# Patient Record
Sex: MALE | Race: WHITE | NOT HISPANIC OR LATINO | URBAN - NONMETROPOLITAN AREA
[De-identification: names, ages, dates, MRNs, and addresses within clinical notes are randomized per-mention and may not be internally consistent; named-entity substitution may affect disease eponyms.]

---

## 2016-04-22 PROBLEM — H25.13: Noted: 2017-11-15

## 2016-04-22 PROBLEM — H43.813: Noted: 2019-10-11

## 2016-04-22 PROBLEM — H11.053: Noted: 2017-11-15

## 2016-04-22 PROBLEM — H25.13: Noted: 2019-10-11

## 2016-04-22 PROBLEM — H11.053: Noted: 2019-10-11

## 2019-04-08 ENCOUNTER — IMPORTED ENCOUNTER (OUTPATIENT)
Dept: URBAN - NONMETROPOLITAN AREA CLINIC 1 | Facility: CLINIC | Age: 69
End: 2019-04-08

## 2019-04-08 PROCEDURE — 92015 DETERMINE REFRACTIVE STATE: CPT

## 2019-04-08 PROCEDURE — 92014 COMPRE OPH EXAM EST PT 1/>: CPT

## 2019-04-08 NOTE — PATIENT DISCUSSION
Peripheral Pterygium - Discussed diagnosis in detail with patient - Appears stable at this time- Continue FML QD OD IOP stable on today's exam 18 OU- Continue to monitor NS OU - Discussed diagnosis in detail with patient- Discussed signs and symptoms associated - Recommend UV protection - No treatment needed at this time- Continue to monitor Compound Hyperopic Astigmatism OU w/Presbyopia-  discussed findings w/patient-  new spectacle Rx issued-  continue to monitor yearly or prn; 's Notes: MR 4/8/2019DFE 4/8/2019

## 2019-10-11 ENCOUNTER — IMPORTED ENCOUNTER (OUTPATIENT)
Dept: URBAN - NONMETROPOLITAN AREA CLINIC 1 | Facility: CLINIC | Age: 69
End: 2019-10-11

## 2019-10-11 PROCEDURE — 92014 COMPRE OPH EXAM EST PT 1/>: CPT

## 2019-10-11 NOTE — PATIENT DISCUSSION
Peripheral Pterygium - Discussed diagnosis in detail with patient - Appears stable at this time- Continue FML QD OD IOP stable on today's exam 17 OU- Continue to monitor NS OU - Discussed diagnosis in detail with patient- Discussed signs and symptoms associated - Recommend UV protection - No treatment needed at this time- Continue to monitor Compound Hyperopic Astigmatism OU w/Presbyopia-  discussed findings w/patient-  new spectacle Rx issued last visit-  continue to monitor yearly or prnPVD OU - Discussed findings of exam in detail with the patient. - The risk of retinal detachment in patients with PVDs was discussed with the patient and the warning signs of retinal detachment were carefully reviewed with the patient. - The patient was warned to return to the office or contact the ophthalmologist on call immediately if they experience signs of retinal detachment or changes noted in vision from today.  - Continue to monitor; 's Notes:  4/8/2019DFE 4/8/2019

## 2020-06-09 ENCOUNTER — IMPORTED ENCOUNTER (OUTPATIENT)
Dept: URBAN - NONMETROPOLITAN AREA CLINIC 1 | Facility: CLINIC | Age: 70
End: 2020-06-09

## 2020-06-09 PROCEDURE — 99214 OFFICE O/P EST MOD 30 MIN: CPT

## 2020-06-09 NOTE — PATIENT DISCUSSION
NS OU - Discussed diagnosis in detail with patient- Discussed signs and symptoms associated - Recommend UV protection - No treatment needed at this time- Continue to monitorPeripheral Pterygium - Discussed diagnosis in detail with patient - Appears stable at this time- Continue FML QD OD- Continue to monitor as scheduled or prnPtosis/Dermatochalasis OU-  discussed findings w/patient-  he is bothered by the drooping and the loss of superior VF-  refer to [facility] Olmstead for further evaluationConjunctival Cyst OS-  discussed findings w/patient-  patient is bothered by it and has tried to pop it-  asked patient not to touch it and will have Medina Olmstead take a look when he comes for Ptosis eval-  continue to monitor as scheduled or prnPVD OU - Discussed findings of exam in detail with the patient. - The risk of retinal detachment in patients with PVDs was discussed with the patient and the warning signs of retinal detachment were carefully reviewed with the patient. - The patient was warned to return to the office or contact the ophthalmologist on call immediately if they experience signs of retinal detachment or changes noted in vision from today.  - Continue to monitor; 's Notes: MR 4/8/2019 defer 6/9/2020DFE 6/9/2020

## 2020-07-08 PROBLEM — H11.442: Noted: 2020-07-08

## 2020-07-08 PROBLEM — H02.831: Noted: 2020-07-08

## 2020-07-08 PROBLEM — H02.834: Noted: 2020-07-08

## 2020-07-08 PROBLEM — H43.813: Noted: 2020-07-08

## 2020-07-08 PROBLEM — H25.13: Noted: 2019-10-11

## 2020-07-08 PROBLEM — H11.053: Noted: 2019-10-11

## 2020-09-22 ENCOUNTER — IMPORTED ENCOUNTER (OUTPATIENT)
Dept: URBAN - NONMETROPOLITAN AREA CLINIC 1 | Facility: CLINIC | Age: 70
End: 2020-09-22

## 2020-09-22 PROBLEM — H11.442: Noted: 2020-10-22

## 2020-09-22 PROBLEM — L72.3: Noted: 2020-10-27

## 2020-09-22 PROBLEM — H11.053: Noted: 2020-09-22

## 2020-09-22 PROBLEM — H25.13: Noted: 2020-09-22

## 2020-09-22 PROBLEM — H11.442: Noted: 2020-09-22

## 2020-09-22 PROBLEM — H02.413: Noted: 2020-10-22

## 2020-09-22 PROBLEM — H43.813: Noted: 2020-07-08

## 2020-09-22 PROBLEM — H43.813: Noted: 2020-10-22

## 2020-09-22 PROBLEM — H02.413: Noted: 2020-10-27

## 2020-09-22 PROBLEM — H02.413: Noted: 2020-09-22

## 2020-09-22 PROCEDURE — 92012 INTRM OPH EXAM EST PATIENT: CPT

## 2020-09-22 NOTE — PATIENT DISCUSSION
Bilateral Ptosis-Ptosis (the upper eyelid being in a lower than normal position) of the upper eyelid was explained to the patient. -RBAs of ptosis repair discussed with patient. Treatment options include observation or surgical correction.-Due to affect of dermatochalasis on aesthetic outcome recommend pt have blepharoplasty during ptosis repair.-Will order ptosis VF and external photos and review results with patient. Will need valium rx not given day of eval. task sent to gerry to schedule. MRD1-1 BLF 13(-) Lag OU TBUT 12 Conjunctival Cyst OS-  discussed findings w/patient-Recommend sugrical removal and discussed all RBA's with patient in detail. Patient wishes to proceed with removal. Can remove this one morning before clinic starts and will have Schroederashlyn Santana call to schedule patient.  task sent to gerry; 's Notes: MR 4/8/2019 defer 6/9/2020DFE 6/9/2020

## 2020-10-08 ENCOUNTER — IMPORTED ENCOUNTER (OUTPATIENT)
Dept: URBAN - NONMETROPOLITAN AREA CLINIC 1 | Facility: CLINIC | Age: 70
End: 2020-10-08

## 2020-10-08 PROCEDURE — 67840 REMOVE EYELID LESION: CPT

## 2020-10-08 NOTE — PATIENT DISCUSSION
Sebaceous cyst discussed with patient. Risk and benefits of removal were explained. Pt elects to have procedure; 's Notes: MR 4/8/2019 defer 6/9/2020DFE 6/9/2020

## 2020-10-22 ENCOUNTER — IMPORTED ENCOUNTER (OUTPATIENT)
Dept: URBAN - NONMETROPOLITAN AREA CLINIC 1 | Facility: CLINIC | Age: 70
End: 2020-10-22

## 2020-10-22 PROCEDURE — 99024 POSTOP FOLLOW-UP VISIT: CPT

## 2020-11-09 ENCOUNTER — IMPORTED ENCOUNTER (OUTPATIENT)
Dept: URBAN - NONMETROPOLITAN AREA CLINIC 1 | Facility: CLINIC | Age: 70
End: 2020-11-09

## 2020-11-09 PROBLEM — H25.13: Noted: 2020-11-09

## 2020-11-09 PROBLEM — H11.442: Noted: 2020-11-09

## 2020-11-09 PROBLEM — H11.053: Noted: 2020-11-09

## 2020-11-09 PROBLEM — H02.413: Noted: 2020-11-09

## 2020-11-09 PROBLEM — H00.022: Noted: 2020-11-09

## 2020-11-09 PROBLEM — H43.813: Noted: 2020-11-09

## 2020-11-09 PROCEDURE — 99024 POSTOP FOLLOW-UP VISIT: CPT

## 2020-11-09 NOTE — PATIENT DISCUSSION
Mild Internal Hordeolum LLL-  discussed findings w/patient-  start hot compresses at least 3x/day-  start Maxitrol QID OS x 5 days then d/c-  continue to monitor as scheduled or prn; 's Notes: MR 4/8/2019 defer 6/9/2020DFE 6/9/2020

## 2021-01-07 ENCOUNTER — IMPORTED ENCOUNTER (OUTPATIENT)
Dept: URBAN - NONMETROPOLITAN AREA CLINIC 1 | Facility: CLINIC | Age: 71
End: 2021-01-07

## 2021-01-07 PROCEDURE — 92285 EXTERNAL OCULAR PHOTOGRAPHY: CPT

## 2021-09-22 ENCOUNTER — IMPORTED ENCOUNTER (OUTPATIENT)
Dept: URBAN - NONMETROPOLITAN AREA CLINIC 1 | Facility: CLINIC | Age: 71
End: 2021-09-22

## 2021-09-22 PROCEDURE — 92083 EXTENDED VISUAL FIELD XM: CPT

## 2022-04-10 ASSESSMENT — TONOMETRY
OD_IOP_MMHG: 17
OS_IOP_MMHG: 18
OS_IOP_MMHG: 17
OD_IOP_MMHG: 17
OS_IOP_MMHG: 17
OD_IOP_MMHG: 16
OS_IOP_MMHG: 17
OD_IOP_MMHG: 17
OS_IOP_MMHG: 17
OD_IOP_MMHG: 18

## 2022-04-10 ASSESSMENT — VISUAL ACUITY
OS_SC: 20/25+
OD_SC: 20/20
OS_SC: 20/20
OS_SC: 20/20
OD_SC: 20/25+3
OD_SC: 20/20-
OU_CC: J1
OS_GLARE: 20/30
OS_GLARE: 20/30
OS_SC: 20/25-
OD_SC: 20/20
OD_SC: 20/20
OD_GLARE: 20/25
OU_SC: 20/20
OS_SC: 20/20-
OU_CC: 20/25+
OD_SC: 20/20
OS_SC: 20/25-
OD_GLARE: 20/30
OD_GLARE: 20/30
OS_GLARE: 20/25

## 2024-05-21 NOTE — PATIENT DISCUSSION
PCP: Ro Negrete MD    LAST REFILL PER CHART:  Medication:butalbital-acetaminophen-caffeine (FIORICET, ESGIC) -40 MG per tablet   Ordered On:04/25/2024  Instructions:Take 1 tablet by mouth every 6 hours as needed for Headaches   Dispense:60 tablets  Refills:0      Future Appointments   Date Time Provider Department Center   5/28/2024  9:30 AM Ro Negrete MD [facility]       
s/p Conjunctival Cyst w/Suture Removal-  discussed findings w/patient-  removed suture at -  continue to monitor as scheduled or prn; 's Notes: MR 4/8/2019 defer 6/9/2020DFE 6/9/2020
Negative